# Patient Record
(demographics unavailable — no encounter records)

---

## 2024-11-18 NOTE — HISTORY OF PRESENT ILLNESS
[FreeTextEntry1] : 73 y.o. female with h/o Hashimoto's disease diagnosed in August 2019 presents for follow up visit.   Started T4 62 mcg daily in September 2019 which she obtained from [place]. Now has been taking LT4 50 mcg daily since 11/2019 when she began seeing me. She is taking T4 50 mcg daily from [facility] and was taking Levoxyl 50 mcg daily. However, when tried Levothyroxine she developed constipation, hair loss and fatigue. No family history of thyroid disease. C/o fatigue but better but does report weight is up Also reports sweating rarely but now more cold intolerance. C/o hair loss. No neck pain or dysphagia or dysphonia. Saw 3 endocrinologists in [facility].   Also diagnosed with right solid thyroid nodule 1.4 cm and being monitored. No head or neck RT exposure. Thyroid ultrasound in January 2020 showed heterogenous gland normal in size with right 1.3 cm isoechoic nodule located immediately adjacent to the lower pole (unclear if this represents a thyroid nodule) and on the left there is a 0.4 cm isoechoic nodule in the midpole. No abnormal LNs are seen. Thyroid ultrasound in November 2020 showed stable 1.4 cm hypoechoic nodule inferior to the right thyroid lobe which may represent a parathyroid gland or LN. On the left gland is heterogenous without discrete nodules. Thyroid ultrasound on 10/13/21 in [facility] showed heterogenous gland without discrete nodules. Thyroid ultrasound on 11/13/23 shows stable right 1.3 cm hypoechoic nodule inferior to the right lobe of the thyroid gland. Left thyroid lobe is heterogenous without discrete nodules.   In regard to Type 2 DM diagnosed over 10 years ago.   Now taking Metformin 500 mg TID and also started Januvia 100 mg daily in October 2023 (Hba1c was 7.3%).   Not monitoring FS at home because lost meter. UTD with ophthalmology and no retinopathy. Needs cataract surgery. No proteinuria. Reports numbness and loss of balance. Saw neurology and felt symptoms related to diabetic neuropathy. Not interested in starting Duloxetine. Did meet with new neurologist who recommended PT. Reports left hand tremor. Sees podiatry for nail care. Saw cardiology. Also had angiogram followed by bleeding complication.   Was walking but now less secondary to neuropathy.   In regard to bone health/osteopenia, DEXA scan on 2/27/20 shows spine -2.1, left hip -1.3 and 1/3 radius 0.6. No fractures. She was taking vitamin D 50,000 Iu every 2 weeks but stopped following PCP recommendations.  Now taking vitamin 50,000 IU every 10 days Did have fall in September 2021 while in [place] and injured b/l knees. Did have fall in 2023.   DEXA scan in 2/2022 showed spine -2.4 with 4.2% decrease and left hip -1.5 with 2.3% decrease.  DEXA scan in 2/2024 showed spine -3.0, left femoral neck -2.6 and total hip -2.0.  I recommend starting Ibandronate given decrease in spine BMD. However, patient never started Ibandronate as recommended by her PCP. Not interested in medical therapy given side effect concerns.   Also diagnosed with frequent UTIs and being followed by urology.

## 2024-11-18 NOTE — REASON FOR VISIT
[Follow - Up] : a follow-up visit [DM Type 2] : DM Type 2 [Hypothyroidism] : hypothyroidism [Thyroid nodule/ MNG] : thyroid nodule/ MNG [Other___] : [unfilled] [Osteoporosis] : osteoporosis

## 2024-11-18 NOTE — ASSESSMENT
[Carbohydrate Consistent Diet] : carbohydrate consistent diet [Diabetes Foot Care] : diabetes foot care [Long Term Vascular Complications] : long term vascular complications of diabetes [Importance of Diet and Exercise] : importance of diet and exercise to improve glycemic control, achieve weight loss and improve cardiovascular health [Levothyroxine] : The patient was instructed to take Levothyroxine on an empty stomach, separate from vitamins, and wait at least 30 minutes before eating [FreeTextEntry1] : 73 y.o. female with h/o Type 2 DM, HTN, hyperlipidemia, Hashimoto's disease, thyroid nodule and osteopenia.  1. Type 2 DM- Good control with Hba1c of 7% today. Discussed pathophysiology. Reviewed blood glucose and Hba1c goals. Encouraged a carbohydrate consistent diet and exercise as tolerated. For now, will increase Metformin to 1,000 mg BID and will continue Januvia 100 mg daily. Will check CMP and urine alb/cr ratio.  2. Hashimoto's disease- Discussed pathophysiology of AITD. Patient appears euthyroid. Will continue Levoxyl/T4 50 mcg daily for now. Will check TFTs today. Regarding thyroid nodule, will monitor for now. Will repeat ultrasound in 2025 given nodule stability.  3. HTN- BP is at goal. Will continue current medications including ACE-I.  4. Hyperlipidemia- Will check lipids and will continue Rosuvastatin 10 mg daily.  5. Hypercalcemia- Normal serum calcium, phosphorus and intact PTH in the past. Encouraged hydration. Will avoid calcium supplements. Will recheck serum calcium today.  6. Osteoporosis- DEXA scan from 2/2024 was reviewed. Risk of fracture is increased. Recommended starting Ibandronate 150 mg po monthly. She is concerned about GI side effects. We discussed options of IV Reclast vs Prolia. Reviewed risks and benefits of both medications. She prefers to monitor for now. Also recommend dental follow up. Encouraged weight bearing activity. Will repeat DEXA scan in 2/2026.    Follow up in 4 to 6 months.  Will check UA with urine culture and follow up with urology.      [Bisphosphonate Therapy] : Risks  and benefits of bisphosphonate therapy were  discussed with the patient including gastroesophageal irritation, osteonecrosis of the jaw, and atypical femur fractures, and acute phase reaction [Denosumab Therapy] : Risks  and benefits of denosumab therapy were discussed with the patient including eczema, cellulitis, osteonecrosis of the jaw and atypical femur fractures

## 2024-11-18 NOTE — HISTORY OF PRESENT ILLNESS
[FreeTextEntry1] : 73 y.o. female with h/o Hashimoto's disease diagnosed in August 2019 presents for follow up visit.   Started T4 62 mcg daily in September 2019 which she obtained from [place]. Now has been taking LT4 50 mcg daily since 11/2019 when she began seeing me. She is taking T4 50 mcg daily from [facility] and was taking Levoxyl 50 mcg daily. However, when tried Levothyroxine she developed constipation, hair loss and fatigue. No family history of thyroid disease. C/o fatigue but better but does report weight is up Also reports sweating rarely but now more cold intolerance. C/o hair loss. No neck pain or dysphagia or dysphonia. Saw 3 endocrinologists in [facility].   Also diagnosed with right solid thyroid nodule 1.4 cm and being monitored. No head or neck RT exposure. Thyroid ultrasound in January 2020 showed heterogenous gland normal in size with right 1.3 cm isoechoic nodule located immediately adjacent to the lower pole (unclear if this represents a thyroid nodule) and on the left there is a 0.4 cm isoechoic nodule in the midpole. No abnormal LNs are seen. Thyroid ultrasound in November 2020 showed stable 1.4 cm hypoechoic nodule inferior to the right thyroid lobe which may represent a parathyroid gland or LN. On the left gland is heterogenous without discrete nodules. Thyroid ultrasound on 10/13/21 in [facility] showed heterogenous gland without discrete nodules. Thyroid ultrasound on 11/13/23 shows stable right 1.3 cm hypoechoic nodule inferior to the right lobe of the thyroid gland. Left thyroid lobe is heterogenous without discrete nodules.   In regard to Type 2 DM diagnosed over 10 years ago.   Now taking Metformin 500 mg TID and also started Januvia 100 mg daily in October 2023 (Hba1c was 7.3%).   Not monitoring FS at home because lost meter. UTD with ophthalmology and no retinopathy. Needs cataract surgery. No proteinuria. Reports numbness and loss of balance. Saw neurology and felt symptoms related to diabetic neuropathy. Not interested in starting Duloxetine. Did meet with new neurologist who recommended PT. Reports left hand tremor. Sees podiatry for nail care. Saw cardiology. Also had angiogram followed by bleeding complication.   Was walking but now less secondary to neuropathy.   In regard to bone health/osteopenia, DEXA scan on 2/27/20 shows spine -2.1, left hip -1.3 and 1/3 radius 0.6. No fractures. She was taking vitamin D 50,000 Iu every 2 weeks but stopped following PCP recommendations.  Now taking vitamin 50,000 IU every 10 days Did have fall in September 2021 while in [place] and injured b/l knees. Did have fall in 2023.   DEXA scan in 2/2022 showed spine -2.4 with 4.2% decrease and left hip -1.5 with 2.3% decrease.  DEXA scan in 2/2024 showed spine -3.0, left femoral neck -2.6 and total hip -2.0.  I recommend starting Ibandronate given decrease in spine BMD. However, patient never started Ibandronate as recommended by her PCP. Not interested in medical therapy given side effect concerns.   Also diagnosed with frequent UTIs and being followed by urology.

## 2024-11-18 NOTE — PHYSICAL EXAM
[Alert] : alert [No Acute Distress] : no acute distress [Normal Sclera/Conjunctiva] : normal sclera/conjunctiva [EOMI] : extra ocular movement intact [No LAD] : no lymphadenopathy [Thyroid Not Enlarged] : the thyroid was not enlarged [No Thyroid Nodules] : no palpable thyroid nodules [No Respiratory Distress] : no respiratory distress [Clear to Auscultation] : lungs were clear to auscultation bilaterally [Normal S1, S2] : normal S1 and S2 [Regular Rhythm] : with a regular rhythm [No Edema] : no peripheral edema [Pedal Pulses Normal] : the pedal pulses are present [Normal Bowel Sounds] : normal bowel sounds [Not Tender] : non-tender [Not Distended] : not distended [Soft] : abdomen soft [Normal Anterior Cervical Nodes] : no anterior cervical lymphadenopathy [No Spinal Tenderness] : no spinal tenderness [No Clubbing, Cyanosis] : no clubbing  or cyanosis of the fingernails [No Rash] : no rash [Right foot was examined, including] : right foot ~C was examined, including visual inspection with sensory and pulse exams [Left foot was examined, including] : left foot ~C was examined, including visual inspection with sensory and pulse exams [Normal] : normal [2+] : 2+ in the dorsalis pedis [Diminished Throughout Both Feet] : diminished tactile sensation with monofilament testing throughout both feet [Normal Reflexes] : deep tendon reflexes were 2+ and symmetric [Normal Affect] : the affect was normal [Normal Mood] : the mood was normal [Kyphosis] : no kyphosis present

## 2024-11-18 NOTE — REVIEW OF SYSTEMS
[Fatigue] : fatigue [Joint Pain] : joint pain [Hair Loss] : hair loss [Pain/Numbness of Digits] : pain/numbness of digits [Negative] : Endocrine [Recent Weight Gain (___ Lbs)] : recent weight gain: [unfilled] lbs [Recent Weight Loss (___ Lbs)] : no recent weight loss [Constipation] : no constipation [Diarrhea] : no diarrhea [Polyuria] : no polyuria [Swelling] : no swelling [FreeTextEntry3] : decrease in vision

## 2024-11-18 NOTE — DATA REVIEWED
[FreeTextEntry1] : Labs\par  10/26/19\par  glucose 119\par  BUN/cr 17/0.68\par  calcium 10\par  chol 158 HDL 46 LDL 88 tri 147\par  TSH 0.76 T4 8.2\par  CBC normal\par   Tg ab 34\par  Hba1c 6.6%\par  25 vitamin D 28\par  \par  \par  9/9/19\par  TSH 7.8\par  calcium 10.3 intact PTH 50.9\par  \par  8/13/19\par  TSH 7.82\par  Hba1c 6.4%\par  25 vitamin D 33\par  BUN/cr 33/0.82\par  chol 183 tri 192 LDL 97 HDL 48\par  \par  5/27/2020\par  TSH 1.08 T4 7.9\par  chol 181 HDL 48 tri 172 \par  glucose 91\par  BUN/cr 14/0.69\par  calcium 10.6\par  Hba1c 6.6%\par  COVID-19 antibody negative\par  \par  6/5/21\par  urine microalb/cr ratio 29\par  TSH 1.43 T4 8.0\par  chol 175 HDL 49 tri 167 LDL 99\par  glucose 124 \par  BUN/cr 16/0.66\par  Hba1c 6.4%\par  H/H 13.2/42.1\par  \par  12/10/21\par  TSH 1.37 T4 8.1\par  chol 166 HDL 46 LDL 93 tri 171\par  glucose 105\par  BUn/cr 11/.62\par  Hba1c 6.6%\par  calcium 10.3\par  \par

## 2025-04-01 NOTE — HISTORY OF PRESENT ILLNESS
[FreeTextEntry1] : 73 y.o. female with h/o Hashimoto's disease diagnosed in August 2019 presents for follow up visit.   Started T4 62 mcg daily in September 2019 which she obtained from [place]. Now has been taking LT4 50 mcg daily since 11/2019 when she began seeing me. She is taking T4 50 mcg daily from [facility] and was taking Levoxyl 50 mcg daily. However, when tried Levothyroxine she developed constipation, hair loss and fatigue. No family history of thyroid disease. Saw 3 endocrinologists in [facility].   C/o fatigue but better but does report weight is up Also reports sweating rarely but now more cold intolerance. C/o hair loss. No neck pain or dysphagia or dysphonia.   Also diagnosed with right solid thyroid nodule 1.4 cm and being monitored. No head or neck RT exposure.   Thyroid ultrasound in January 2020 showed heterogenous gland normal in size with right 1.3 cm isoechoic nodule located immediately adjacent to the lower pole (unclear if this represents a thyroid nodule) and on the left there is a 0.4 cm isoechoic nodule in the midpole. No abnormal LNs are seen.  Thyroid ultrasound in November 2020 showed stable 1.4 cm hypoechoic nodule inferior to the right thyroid lobe which may represent a parathyroid gland or LN. On the left gland is heterogenous without discrete nodules.  Thyroid ultrasound on 10/13/21 in [facility] showed heterogenous gland without discrete nodules.  Thyroid ultrasound on 11/13/23 shows stable right 1.3 cm hypoechoic nodule inferior to the right lobe of the thyroid gland. Left thyroid lobe is heterogenous without discrete nodules.   In regard to Type 2 DM diagnosed over 10 years ago.   Now taking Metformin 500 mg BID and Januvia 100 mg daily.  She is monitoring FS at home and after meal was 146.   UTD with ophthalmology and no retinopathy. S/p cataract surgery. No proteinuria. Reports numbness and loss of balance. Saw neurology and felt symptoms related to diabetic neuropathy. Not interested in starting Duloxetine. Did meet with new neurologist who recommended PT. Reports left hand tremor. Sees podiatry for nail care. Saw cardiology. Also had angiogram followed by bleeding complication.   Was walking but now less secondary to neuropathy. Also seeing pain management for LE pain.   In regard to bone health/osteopenia, DEXA scan on 2/27/20 shows spine -2.1, left hip -1.3 and 1/3 radius 0.6. No fractures. She was taking vitamin D 50,000 Iu every 2 weeks but stopped following PCP recommendations.  Now taking vitamin 50,000 IU every 20 days Did have fall in September 2021 while in [place] and injured b/l knees. Did have fall in 2023.   DEXA scan in 2/2022 showed spine -2.4 with 4.2% decrease and left hip -1.5 with 2.3% decrease.  DEXA scan in 2/2024 showed spine -3.0, left femoral neck -2.6 and total hip -2.0.  I recommend starting Ibandronate given decrease in spine BMD. However, patient never started Ibandronate as recommended by her PCP. Not interested in medical therapy given side effect concerns.   Also diagnosed with frequent UTIs and being followed by urology.

## 2025-04-01 NOTE — ASSESSMENT
[Carbohydrate Consistent Diet] : carbohydrate consistent diet [Diabetes Foot Care] : diabetes foot care [Long Term Vascular Complications] : long term vascular complications of diabetes [Importance of Diet and Exercise] : importance of diet and exercise to improve glycemic control, achieve weight loss and improve cardiovascular health [Bisphosphonate Therapy] : Risks  and benefits of bisphosphonate therapy were  discussed with the patient including gastroesophageal irritation, osteonecrosis of the jaw, and atypical femur fractures, and acute phase reaction [Denosumab Therapy] : Risks  and benefits of denosumab therapy were discussed with the patient including eczema, cellulitis, osteonecrosis of the jaw and atypical femur fractures [Levothyroxine] : The patient was instructed to take Levothyroxine on an empty stomach, separate from vitamins, and wait at least 30 minutes before eating [FreeTextEntry1] : 73 y.o. female with h/o Type 2 DM, HTN, hyperlipidemia, Hashimoto's disease, thyroid nodule and osteopenia.  1. Type 2 DM- Good control with Hba1c of 7.1% today. Discussed pathophysiology. Reviewed blood glucose and Hba1c goals. Encouraged a carbohydrate consistent diet and exercise as tolerated. For now, will continue Metformin 500 mg BID and will continue Januvia 100 mg daily. Normal CMP and urine alb/cr ratio in 2/2025.  2. Hashimoto's disease- Discussed pathophysiology of AITD. Patient is euthyroid. Will continue Levoxyl/T4 50 mcg daily for now. Regarding thyroid nodule, will monitor for now. Will repeat ultrasound in 11/2025 given nodule stability.  3. HTN- BP is at goal. Will continue current medications including ACE-I.  4. Hyperlipidemia- UTD with lipids and will continue Rosuvastatin 10 mg daily.  5. Hypercalcemia- Normal serum calcium, phosphorus and intact PTH in the past. Encouraged hydration. Will avoid calcium supplements. Will recheck serum calcium and intact PTH with magnesium today.  6. Osteoporosis- DEXA scan from 2/2024 was reviewed. Risk of fracture is increased. Recommended starting Ibandronate 150 mg po monthly. She is concerned about GI side effects. We discussed options of IV Reclast vs Prolia. Reviewed risks and benefits of both medications. She prefers to monitor for now. Also recommend dental follow up. Encouraged weight bearing activity. Will repeat DEXA scan in 2/2026.    Follow up in 4 to 6 months.

## 2025-04-01 NOTE — DATA REVIEWED
[FreeTextEntry1] : Labs 10/26/19 glucose 119 BUN/cr 17/0.68 calcium 10 chol 158 HDL 46 LDL 88 tri 147 TSH 0.76 T4 8.2 CBC normal  Tg ab 34 Hba1c 6.6% 25 vitamin D 28   9/9/19 TSH 7.8 calcium 10.3 intact PTH 50.9  8/13/19 TSH 7.82 Hba1c 6.4% 25 vitamin D 33 BUN/cr 33/0.82 chol 183 tri 192 LDL 97 HDL 48  5/27/2020 TSH 1.08 T4 7.9 chol 181 HDL 48 tri 172  glucose 91 BUN/cr 14/0.69 calcium 10.6 Hba1c 6.6% COVID-19 antibody negative  6/5/21 urine microalb/cr ratio 29 TSH 1.43 T4 8.0 chol 175 HDL 49 tri 167 LDL 99 glucose 124  BUN/cr 16/0.66 Hba1c 6.4% H/H 13.2/42.1  12/10/21 TSH 1.37 T4 8.1 chol 166 HDL 46 LDL 93 tri 171 glucose 105 BUn/cr 11/.62 Hba1c 6.6% calcium 10.3  2/12/25 BUN/cr 17/.70 calcium 10.8 LFTs normal Hba1c 7.3% TSH 1.74 T4 7.7 H/H 13.3/41.1 chol 151 HDL 41 LDL 82 tri 182 glucose 139 25 vitamin D 82

## 2025-04-01 NOTE — HISTORY OF PRESENT ILLNESS
[FreeTextEntry1] : 73 y.o. female with h/o Hashimoto's disease diagnosed in August 2019 presents for follow up visit.   Started T4 62 mcg daily in September 2019 which she obtained from [place]. Now has been taking LT4 50 mcg daily since 11/2019 when she began seeing me. She is taking T4 50 mcg daily from [facility] and was taking Levoxyl 50 mcg daily. However, when tried Levothyroxine she developed constipation, hair loss and fatigue. No family history of thyroid disease. Saw 3 endocrinologists in [facility].   C/o fatigue but better but does report weight is up Also reports sweating rarely but now more cold intolerance. C/o hair loss. No neck pain or dysphagia or dysphonia.   Also diagnosed with right solid thyroid nodule 1.4 cm and being monitored. No head or neck RT exposure.   Thyroid ultrasound in January 2020 showed heterogenous gland normal in size with right 1.3 cm isoechoic nodule located immediately adjacent to the lower pole (unclear if this represents a thyroid nodule) and on the left there is a 0.4 cm isoechoic nodule in the midpole. No abnormal LNs are seen.  Thyroid ultrasound in November 2020 showed stable 1.4 cm hypoechoic nodule inferior to the right thyroid lobe which may represent a parathyroid gland or LN. On the left gland is heterogenous without discrete nodules.  Thyroid ultrasound on 10/13/21 in [facility] showed heterogenous gland without discrete nodules.  Thyroid ultrasound on 11/13/23 shows stable right 1.3 cm hypoechoic nodule inferior to the right lobe of the thyroid gland. Left thyroid lobe is heterogenous without discrete nodules.   In regard to Type 2 DM diagnosed over 10 years ago.   Now taking Metformin 500 mg BID and Januvia 100 mg daily.  She is monitoring FS at home and after meal was 146.   UTD with ophthalmology and no retinopathy. S/p cataract surgery. No proteinuria. Reports numbness and loss of balance. Saw neurology and felt symptoms related to diabetic neuropathy. Not interested in starting Duloxetine. Did meet with new neurologist who recommended PT. Reports left hand tremor. Sees podiatry for nail care. Saw cardiology. Also had angiogram followed by bleeding complication.   Was walking but now less secondary to neuropathy. Also seeing pain management for LE pain.   In regard to bone health/osteopenia, DEXA scan on 2/27/20 shows spine -2.1, left hip -1.3 and 1/3 radius 0.6. No fractures. She was taking vitamin D 50,000 Iu every 2 weeks but stopped following PCP recommendations.  Now taking vitamin 50,000 IU every 20 days Did have fall in September 2021 while in [place] and injured b/l knees. Did have fall in 2023.   DEXA scan in 2/2022 showed spine -2.4 with 4.2% decrease and left hip -1.5 with 2.3% decrease.  DEXA scan in 2/2024 showed spine -3.0, left femoral neck -2.6 and total hip -2.0.  I recommend starting Ibandronate given decrease in spine BMD. However, patient never started Ibandronate as recommended by her PCP. Not interested in medical therapy given side effect concerns.   Also diagnosed with frequent UTIs and being followed by urology.

## 2025-04-01 NOTE — REVIEW OF SYSTEMS
[Fatigue] : fatigue [Recent Weight Gain (___ Lbs)] : recent weight gain: [unfilled] lbs [Joint Pain] : joint pain [Hair Loss] : hair loss [Pain/Numbness of Digits] : pain/numbness of digits [Negative] : Endocrine [Recent Weight Loss (___ Lbs)] : no recent weight loss [Constipation] : no constipation [Diarrhea] : no diarrhea [Polyuria] : no polyuria [Swelling] : no swelling [FreeTextEntry3] : decrease in vision

## 2025-04-01 NOTE — DATA REVIEWED
Detail Level: Detailed [FreeTextEntry1] : Labs 10/26/19 glucose 119 BUN/cr 17/0.68 calcium 10 chol 158 HDL 46 LDL 88 tri 147 TSH 0.76 T4 8.2 CBC normal  Tg ab 34 Hba1c 6.6% 25 vitamin D 28   9/9/19 TSH 7.8 calcium 10.3 intact PTH 50.9  8/13/19 TSH 7.82 Hba1c 6.4% 25 vitamin D 33 BUN/cr 33/0.82 chol 183 tri 192 LDL 97 HDL 48  5/27/2020 TSH 1.08 T4 7.9 chol 181 HDL 48 tri 172  glucose 91 BUN/cr 14/0.69 calcium 10.6 Hba1c 6.6% COVID-19 antibody negative  6/5/21 urine microalb/cr ratio 29 TSH 1.43 T4 8.0 chol 175 HDL 49 tri 167 LDL 99 glucose 124  BUN/cr 16/0.66 Hba1c 6.4% H/H 13.2/42.1  12/10/21 TSH 1.37 T4 8.1 chol 166 HDL 46 LDL 93 tri 171 glucose 105 BUn/cr 11/.62 Hba1c 6.6% calcium 10.3  2/12/25 BUN/cr 17/.70 calcium 10.8 LFTs normal Hba1c 7.3% TSH 1.74 T4 7.7 H/H 13.3/41.1 chol 151 HDL 41 LDL 82 tri 182 glucose 139 25 vitamin D 82